# Patient Record
Sex: FEMALE | Race: WHITE | Employment: FULL TIME | URBAN - METROPOLITAN AREA
[De-identification: names, ages, dates, MRNs, and addresses within clinical notes are randomized per-mention and may not be internally consistent; named-entity substitution may affect disease eponyms.]

---

## 2024-08-13 ENCOUNTER — HOSPITAL ENCOUNTER (OUTPATIENT)
Dept: CT IMAGING | Facility: HOSPITAL | Age: 59
Discharge: HOME | End: 2024-08-13
Attending: FAMILY MEDICINE

## 2024-08-13 VITALS
SYSTOLIC BLOOD PRESSURE: 122 MMHG | WEIGHT: 151 LBS | BODY MASS INDEX: 21.86 KG/M2 | DIASTOLIC BLOOD PRESSURE: 80 MMHG | HEIGHT: 69.5 IN

## 2024-08-13 DIAGNOSIS — Z13.6 SCREENING FOR CARDIOVASCULAR CONDITION: ICD-10-CM

## 2024-08-13 LAB
POCT GLUCOSE CHOLESTECH: 100 (ref 70–99)
POCT HDL: 96 (ref 40–60)
POCT TOTAL CHOLESTEROL: 227 (ref 110–200)

## 2024-08-14 NOTE — PROGRESS NOTES
Date of Service 8/13/2024    KIM HIDALGO  Date of Birth 12/24/1965    Patient Age: 58 year old    PCP: Brandon Nova  [address]  [facility] [id number]    Heart Scan Consult  Preliminary Heart Scan Score: 0    Previous Screening  Heart Scan Completed Previously: No        Peripheral Vascular Scan Completed Previously: No          Risk Factors  Personal Risk Factors  Non-alterable Risk Factors: Age;Family History      Body Mass Index  Body mass index is 21.98 kg/m².    Blood Pressure     /80     (Normal =< 120/80,  Elevated = 120-129/ >80,  High Stage1 130-139/80-89 , Stage2 >140/>90)    Lipid Profile  Patient was in fasting state: Yes    Cholesterol: 227, done on 8/13/2024.  HDL Cholesterol: 96, done on 8/13/2024.  LDL N/A Trig <45    Cholesterol Goals  Value   Total  =< 200   HDL  = > 45 Men = > 55 Women   LDL   =< 100   Triglycerides  =< 150       Glucose and Hemoglobin A1C  Lab Results   Component Value Date    PGLU 100 (A) 08/13/2024     (Normal Fasting Glucose < 100mg/dl )    Nurse Review  Risk factor information and results reviewed with Nurse: Yes    Recommended Follow Up:  Consult your physician regarding:: Final Heart Scan Report;Discuss potential for Incidental Finding;Discuss Potential for Score Variance      Recommendations for Change:  Nutrition Changes: Low Saturated Fat;Low Fat Dairy;Low Salt Eating         Exercise:  (exercises regularly)         Weight Management: Maintain Current Weight         Repeat Heart Scan: 5 years if Calcium Score is 0.0;Discuss with your Physician              [facility] Recommended Resources:  Recommended Resources: Upcoming Classes, Medical Services and Health Library [email or web address];PV Screening  Recommended PV Screening: Abdomen;Carotids         Melani GABRIEL RN        Please Contact the Nurse Heart Line with any Questions or Concerns [phone].

## 2025-08-26 ENCOUNTER — APPOINTMENT (OUTPATIENT)
Dept: CT IMAGING | Facility: HOSPITAL | Age: 60
End: 2025-08-26

## 2025-08-26 ENCOUNTER — HOSPITAL ENCOUNTER (EMERGENCY)
Facility: HOSPITAL | Age: 60
Discharge: HOME | End: 2025-08-26
Attending: EMERGENCY MEDICINE

## 2025-08-26 VITALS
DIASTOLIC BLOOD PRESSURE: 85 MMHG | RESPIRATION RATE: 18 BRPM | HEART RATE: 66 BPM | OXYGEN SATURATION: 100 % | BODY MASS INDEX: 22 KG/M2 | WEIGHT: 153 LBS | TEMPERATURE: 98 F | SYSTOLIC BLOOD PRESSURE: 143 MMHG

## 2025-08-26 DIAGNOSIS — H53.8 BLURRY VISION, LEFT EYE: Primary | ICD-10-CM

## 2025-08-26 PROCEDURE — 99284 EMERGENCY DEPT VISIT MOD MDM: CPT

## 2025-08-26 PROCEDURE — 70450 CT HEAD/BRAIN W/O DYE: CPT

## 2025-08-26 PROCEDURE — 70480 CT ORBIT/EAR/FOSSA W/O DYE: CPT
